# Patient Record
(demographics unavailable — no encounter records)

---

## 2024-12-17 NOTE — PHYSICAL EXAM
[Chaperone Present] : A chaperone was present in the examining room during all aspects of the physical examination [42770] : A chaperone was present during the pelvic exam.

## 2024-12-17 NOTE — PHYSICAL EXAM
[Chaperone Present] : A chaperone was present in the examining room during all aspects of the physical examination [[id number]] : A chaperone was present during the pelvic exam.

## 2025-06-10 NOTE — HISTORY OF PRESENT ILLNESS
[FreeTextEntry1] : Patient presents for urgent follow-up visit Since her recent visit she states that she feels a slight discomfort around the urethra and is concerned she may have a urinary tract infection. No dysuria no hematuria no urgency no vaginal discharge no odor no vaginal bleeding no fever No recent intercourse

## 2025-06-10 NOTE — DISCUSSION/SUMMARY
[FreeTextEntry1] : Patient presents for follow-up urgent examination requesting to rule out urinary infection She states that she feels a slight discomfort around the urethral meatus when she is ready to void She states that this has been present since her last GYN exam 5 days ago She has been abstinent for a considerable period of time Physical exam today is unremarkable We discussed various possibilities including urethritis, residual discomfort from the pelvic examination although patient states she had no discomfort at the time of the examination, candidal or bacterial vaginitis and urethritis. Dipstick urine appears normal but will be sent for a culture.  A NAAT is also repeated Patient is encouraged to increase her fluids intake Will call for cultures results

## 2025-06-10 NOTE — PHYSICAL EXAM
[Chaperoned Physical Exam] : A chaperone was present in the examining room during all aspects of the physical examination. [MA] : MA [Labia Majora] : normal [Normal] : normal [Uterine Adnexae] : non-palpable [FreeTextEntry2] : Claribel Ziegler [FreeTextEntry7] : Abdomen is soft moderately obese.  Nontender no adenopathy and no masses [FreeTextEntry3] : No discharge no rubor no swelling.  There is slight tenderness to palpation with a cotton swab just above the urethral meatus but no lesions are visible. [FreeTextEntry5] : Closed and nontender [FreeTextEntry4] : Slight transparent discharge which appears physiologic [FreeTextEntry6] : No abnormal palpable masses